# Patient Record
Sex: MALE | Race: WHITE | Employment: STUDENT | URBAN - METROPOLITAN AREA
[De-identification: names, ages, dates, MRNs, and addresses within clinical notes are randomized per-mention and may not be internally consistent; named-entity substitution may affect disease eponyms.]

---

## 2017-01-04 PROCEDURE — 82306 VITAMIN D 25 HYDROXY: CPT | Performed by: INTERNAL MEDICINE

## 2017-01-04 PROCEDURE — 36415 COLL VENOUS BLD VENIPUNCTURE: CPT | Performed by: INTERNAL MEDICINE

## 2017-01-04 PROCEDURE — 84305 ASSAY OF SOMATOMEDIN: CPT | Performed by: INTERNAL MEDICINE

## 2017-01-04 PROCEDURE — 83002 ASSAY OF GONADOTROPIN (LH): CPT | Performed by: INTERNAL MEDICINE

## 2017-01-04 PROCEDURE — 82670 ASSAY OF TOTAL ESTRADIOL: CPT | Performed by: INTERNAL MEDICINE

## 2017-01-04 PROCEDURE — 83001 ASSAY OF GONADOTROPIN (FSH): CPT | Performed by: INTERNAL MEDICINE

## 2017-01-23 ENCOUNTER — TELEPHONE (OUTPATIENT)
Dept: INTERNAL MEDICINE CLINIC | Facility: CLINIC | Age: 22
End: 2017-01-23

## 2017-01-23 NOTE — TELEPHONE ENCOUNTER
Pt's mother notified AS states pt can try Tylenol or Aleve as directed on the bottle. Mother verbalizes understanding.

## 2017-01-23 NOTE — TELEPHONE ENCOUNTER
Pt's mother states pt is at [facility], experiencing h/a's, Excedrin Migraine is not helping, no visual changes. Pt is being treated by Dr. Shahram Shore for Gonadotropin deficiency with Testosterone injections 3x's a week.   Dr Shahram Shore had ordered an MRI Brain for pt which sh

## 2017-01-23 NOTE — TELEPHONE ENCOUNTER
LOV 7/18/16 with AS. Copied AS notes:    A/P:    Low testosterone  Gonadotropin deficiency (hcc)  Pe (physical exam), annual  (primary encounter diagnosis)    Continue f/u with endo  No rpt labs needed now, wnl aside for low T.     No orders of the defined

## 2017-01-31 PROBLEM — E23.0 HYPOGONADOTROPIC HYPOGONADISM (HCC): Status: ACTIVE | Noted: 2017-01-31
